# Patient Record
(demographics unavailable — no encounter records)

---

## 2024-10-10 NOTE — REASON FOR VISIT
[Home] : at home, [unfilled] , at the time of the visit. [Medical Office: ([facility])___] : at the medical office located in  [Mother] : mother [FreeTextEntry2] : mother

## 2024-10-10 NOTE — HISTORY OF PRESENT ILLNESS
[de-identified] : cereal with milk or eggs or bagel or pancakes or waffle [de-identified] : sandwich or rice with chicken [de-identified] : chips, grapes [de-identified] : salmon with quinoa, or burger with fries, or chicken nuggets [FreeTextEntry1] : Nutritionist Intake: 13 year old male with Crohn's disease on Infliximab, referred by Dr. Maldonado for dietary counseling. Josr initially presented early August 2023 with 10+ pound weight loss and loss of appetite, occasional abdominal pain and nausea.   Now with good wt gain and growth. Josr had 1-2 days of abdominal pain, decreased appetite, vomiting starting Monday. Most likely viral and not IBD related. Reports feeling much better today.   Pt typically has a good appetite and is a good eater. He eats a variety of foods, including fruits, vegetables, fish, chicken, beef, eggs, cashews.  Mom cooks most meals at home. They eat Armenian food and Syrian food (roti, rice, chicken, bok luigi, broccoli).  Pt eats fast food ([employer], [employer], or pizza) every other week. Pt says oreos bother his stomach. No food allergies. Pt is taking a MVI gummy and probiotic.

## 2025-02-26 NOTE — HISTORY OF PRESENT ILLNESS
[FreeTextEntry1] : NP rash [de-identified] : NP here with mom Rash on body and scalp Hx of Crohn's- feels like rash on body started before patient was started on renflexis (infliximab) but scalp issues started after Crohn's was hard to control for patient- finally got it under control so would prefer to stay on medication Have been using traimcinolone oint to body with good response Using keto shampoo and a steroid soln to scalp from outside derm

## 2025-02-26 NOTE — PHYSICAL EXAM
[FreeTextEntry3] : Hyperpigmented round patches upper extremities, AC fossa Two scaly plaques b/l frontal scalp

## 2025-02-26 NOTE — ASSESSMENT
[FreeTextEntry1] : #Dermatitis of body- favor nummular derm vs psoriasis #Scalp psoriasis- chronic, flare -C/w triamcinolone 0.1% ointment to AA bid as needed for up to 2 weeks at a time. SED. If more minimally active can use tacrolimus instead. 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

## 2025-05-22 NOTE — HISTORY OF PRESENT ILLNESS
[de-identified] : Overview: Josr presented early August 2023 with 10+ pound weight loss and loss of appetite, occasional abdominal pain and nausea. Initial labs identified an elevated ESR (38) otherwise unremarkable. Celiac serologies negative (mother has celiac disease). An EGD identified antral erythema, and erythema and ulceration in the duodenal bulb. Colonoscopy found erythema in the terminal ileum and rectum. Histopathology was notable for severe duodenitis in the bulb with neutrophils, villous atrophy and gastric metaplasia, focal chronic active gastritis, lymphocytic esophagitis, focal active ileitis with a focus suspicious for a microgranuloma, and in the cecum a single non-necrotizing granuloma. An MRE found inflammatory changes in the distal and terminal ileum. Infliximab was started October 2023. He had an excellent response to induction. Week 6 IFX trough 27. Had been doing well on an every 8 weeks until April when he presented with generalized abdominal pain and increased bowel movement frequency. His Renflexis dose was increased to 400 mg and shortened to a 5 week interval based on trough and clinical status. In September had diarrhea and pain leading up to his infusion therefore dose again was optimized to every 4 weeks. A scope for mucosal healing was performed in December 2024 which was both grossly and histologically normal.      Mother 4'11'' Father 5'4''  Previous Evaluations 9/2024 Quant Gold: negative   2023 Hep BsAg Negative, BsAb REACTIVE Varicella IgG: Positive.

## 2025-05-23 NOTE — HISTORY OF PRESENT ILLNESS
[de-identified] : Overview: Josr presented early August 2023 with 10+ pound weight loss and loss of appetite, occasional abdominal pain and nausea. Initial labs identified an elevated ESR (38) otherwise unremarkable. Celiac serologies negative (mother has celiac disease). An EGD identified antral erythema, and erythema and ulceration in the duodenal bulb. Colonoscopy found erythema in the terminal ileum and rectum. Histopathology was notable for severe duodenitis in the bulb with neutrophils, villous atrophy and gastric metaplasia, focal chronic active gastritis, lymphocytic esophagitis, focal active ileitis with a focus suspicious for a microgranuloma, and in the cecum a single non-necrotizing granuloma. An MRE found inflammatory changes in the distal and terminal ileum. Infliximab was started October 2023. He had an excellent response to induction. Week 6 IFX trough 27. Had been doing well on an every 8 weeks until April when he presented with generalized abdominal pain and increased bowel movement frequency. His Renflexis dose was increased to 400 mg and shortened to a 5 week interval based on trough and clinical status. In September had diarrhea and pain leading up to his infusion therefore dose again was optimized to every 4 weeks. A scope for mucosal healing was performed in December 2024 which was both grossly and histologically normal.     Mother 4'11'' Father 5'4''  Previous Evaluations 9/2024 Quant Gold: negative  2023 Hep BsAg Negative, BsAb REACTIVE Varicella IgG: Positive.

## 2025-05-23 NOTE — CONSULT LETTER
[Dear  ___] : Dear  [unfilled], [Courtesy Letter:] : I had the pleasure of seeing your patient, [unfilled], in my office today. [Please see my note below.] : Please see my note below. [Consult Closing:] : Thank you very much for allowing me to participate in the care of this patient.  If you have any questions, please do not hesitate to contact me. [Sincerely,] : Sincerely, [FreeTextEntry3] : COLE Barrera

## 2025-05-23 NOTE — HISTORY OF PRESENT ILLNESS
[de-identified] : Overview: Josr presented early August 2023 with 10+ pound weight loss and loss of appetite, occasional abdominal pain and nausea. Initial labs identified an elevated ESR (38) otherwise unremarkable. Celiac serologies negative (mother has celiac disease). An EGD identified antral erythema, and erythema and ulceration in the duodenal bulb. Colonoscopy found erythema in the terminal ileum and rectum. Histopathology was notable for severe duodenitis in the bulb with neutrophils, villous atrophy and gastric metaplasia, focal chronic active gastritis, lymphocytic esophagitis, focal active ileitis with a focus suspicious for a microgranuloma, and in the cecum a single non-necrotizing granuloma. An MRE found inflammatory changes in the distal and terminal ileum. Infliximab was started October 2023. He had an excellent response to induction. Week 6 IFX trough 27. Had been doing well on an every 8 weeks until April when he presented with generalized abdominal pain and increased bowel movement frequency. His Renflexis dose was increased to 400 mg and shortened to a 5 week interval based on trough and clinical status. In September had diarrhea and pain leading up to his infusion therefore dose again was optimized to every 4 weeks. A scope for mucosal healing was performed in December 2024 which was both grossly and histologically normal.     Mother 4'11'' Father 5'4''  Previous Evaluations 9/2024 Quant Gold: negative  2023 Hep BsAg Negative, BsAb REACTIVE Varicella IgG: Positive.

## 2025-05-23 NOTE — ASSESSMENT
[Educated Patient & Family about Diagnosis] : educated the patient and family about the diagnosis [FreeTextEntry1] : Josr is a 13 year old with ileal and upper tract Crohn's disease whom is in a remission on Infliximab monotherapy, with recent scope for mucosal healing. Current issues include ongoing issues with abdominal pain and constipation and a new onset of psoriasis/ dermatitis. Discussed in detail the concern for anti-TNF induced psoriasiform dermatitis and possibility of drug class change should therapies prescribed by dermatologist fail to control symptoms.  Should remain on IFX for now every 4 weeks with plan for labs with Bere infusion If trough supratherapeutic we will consider lengthening intervals Continue close contact with dermatologist- follow up visit next week To send growth records for review Continue Miralax and encourage titrating of dose to adequately control constipation as this is likely contributing to his intermittent abdominal pain  Call with an update after dermatologist visit, PRN

## 2025-05-23 NOTE — PHYSICAL EXAM
[Well Developed] : well developed [NAD] : in no acute distress [PERRL] : pupils were equal, round, reactive to light  [Moist & Pink Mucous Membranes] : moist and pink mucous membranes [CTAB] : lungs clear to auscultation bilaterally [Regular Rate and Rhythm] : regular rate and rhythm [Normal S1, S2] : normal S1 and S2 [Soft] : soft  [Normal Bowel Sounds] : normal bowel sounds [No HSM] : no hepatosplenomegaly appreciated [Normal Tone] : normal tone [Well-Perfused] : well-perfused [Interactive] : interactive [icteric] : anicteric [Respiratory Distress] : no respiratory distress  [Distended] : non distended [Tender] : non tender [Edema] : no edema [Cyanosis] : no cyanosis [Rash] : no rash [Jaundice] : no jaundice [de-identified] : erythema of the umbilicus; patch of eczema on right upper arm; scaling lesions with flaking skin in the scalp

## 2025-05-23 NOTE — HISTORY OF PRESENT ILLNESS
[de-identified] : Overview: Josr presented early August 2023 with 10+ pound weight loss and loss of appetite, occasional abdominal pain and nausea. Initial labs identified an elevated ESR (38) otherwise unremarkable. Celiac serologies negative (mother has celiac disease). An EGD identified antral erythema, and erythema and ulceration in the duodenal bulb. Colonoscopy found erythema in the terminal ileum and rectum. Histopathology was notable for severe duodenitis in the bulb with neutrophils, villous atrophy and gastric metaplasia, focal chronic active gastritis, lymphocytic esophagitis, focal active ileitis with a focus suspicious for a microgranuloma, and in the cecum a single non-necrotizing granuloma. An MRE found inflammatory changes in the distal and terminal ileum. Infliximab was started October 2023. He had an excellent response to induction. Week 6 IFX trough 27. Had been doing well on an every 8 weeks until April when he presented with generalized abdominal pain and increased bowel movement frequency. His Renflexis dose was increased to 400 mg and shortened to a 5 week interval based on trough and clinical status. In September had diarrhea and pain leading up to his infusion therefore dose again was optimized to every 4 weeks. A scope for mucosal healing was performed in December 2024 which was both grossly and histologically normal.  Interval hx: Continues to receive infliximab (Renflexis) 400 mg every 4 weeks with a trough of 25 in Jan 2025. He has been feeling well from infusion to infusion with rare abdominal pain. Continues to use MiraLAX PRN, 1 tbsp, for constipation. He is having daily to every other day bowel movements, large caliber. He rate of weight gain has been adequate but weight continues to remain at the 3rd percentile. Recently established care with dermatology for psorasis of the scalp and umbilicus and eczema/ dermaitis of his upper extremities.     Mother 4'11'' Father 5'4''  Previous Evaluations 9/2024 Quant Gold: negative  2023 Hep BsAg Negative, BsAb REACTIVE Varicella IgG: Positive.

## 2025-05-27 NOTE — ASSESSMENT
[FreeTextEntry1] : #Dermatitis of body - favor Psoriasis (potentially TNFi induced) vs Eczema new diagnosis of uncertain prognosis #Scalp psoriasis  chronic, improving - discussed good chance the rash on the scalp and body is related to infliximab - although rash is slowly progressing to now involve umbilicus, there is limited BSA and symptom burden. Will try to control with topical steroids for now so pt can c/w Infliximab as Crohns was hard to control - if not able to control w topicals, can consider skin biopsy to confirm dx and then adding adjunctive systemic agents vs biologic switch PLAN: - For Body: c/w triamcinolone 0.1% ointment to AA bid as needed for up to 2 weeks at a time. SED. Can use tacrolimus during off weeks - For Scalp: c/w Keto shampoo 2-3x/week. Can use Lidex soln to scalp bid prn up to 2 weeks at a time, SED  RTC 3 months

## 2025-05-27 NOTE — HISTORY OF PRESENT ILLNESS
[FreeTextEntry1] : f/u rash [de-identified] : RP here with mom Rash on body and scalp Hx of Crohn's- feels like rash on body started before patient was started on renflexis (infliximab) but scalp issues started after Crohn's was hard to control for patient- finally got it under control so would prefer to stay on medication - scalp has cleared up using Keto shampoo and Fluocinonide soln, not itchy - rash on body unimproved on arms, now with new involvement in bellybutton. rash is not itchy. has TAC oint and Tacro oint at home from prev derm, mom has been applying Tacro oint to the area w limited improvement - no joint pain/stiffness. Crohn's well controlled - no abd pain, gets constipated at times, taking Miralax.

## 2025-05-27 NOTE — PHYSICAL EXAM
[Alert] : alert [Oriented x 3] : ~L oriented x 3 [Well Nourished] : well nourished [Conjunctiva Non-injected] : conjunctiva non-injected [No Visual Lymphadenopathy] : no visual  lymphadenopathy [No Clubbing] : no clubbing [No Edema] : no edema [No Bromhidrosis] : no bromhidrosis [No Chromhidrosis] : no chromhidrosis [FreeTextEntry3] : scalp clear AC fossa, umbilicus with thin hypopigmented scaly plaques R upper arm w scaly, lichenified plaque

## 2025-07-18 NOTE — HISTORY OF PRESENT ILLNESS
[FreeTextEntry2] : Josr is a 14-year 7-month-old young man with Crohn's disease diagnosed at 12 years of age and family history of short stature who presents for initial endocrine evaluation of short stature  Medical history is significant for Crohn's disease which was diagnosed 2 years ago in the setting of diarrhea, weight loss and belly pain.  He sees Dr. Puente and has been stable on Renflexsis.   Review of pediatrician growth chart reveals steady linear growth around the 10th percentile from 4 to 10 years of age with mild deceleration to the 5th percentile until today.  Linear growth today is noted in the 4th percentile with BMI in the 44th percentile.  Mom notes since treatment treating his IBD he has gained significant weight back and feels well. There is no family history significant for growth hormone deficiency, thyroid disease.  Family history significant for mom with celiac disease.  There are however multiple people in the family on the shorter side including mom, maternal grandmother and paternal grandmother at 59 inches with dad at 63 inches.  He also has a 16-year-old brother who was somewhat of a late jitendra and is around that site. Mom's height 59 inches Dad's height 63 inches Maternal grandmother's 59 inches Paternal grandmother 59 inches Brother 62 inches 16 years old  Mom has emphasized that she would just like to make sure that he is healthy but is not inclined to give him medications that will augment his height if they are not needed.

## 2025-07-18 NOTE — CONSULT LETTER
[Dear  ___] : Dear  [unfilled], [Consult Letter:] : I had the pleasure of evaluating your patient, [unfilled]. [Please see my note below.] : Please see my note below. [Sincerely,] : Sincerely, [FreeTextEntry3] : Sayra Fowler MD  [facility] Physician [facility] Division of Pediatric Endocrinology P: [phone] F: [phone]

## 2025-07-18 NOTE — ASSESSMENT
[FreeTextEntry1] : Josr is a 14-year 7-month-old with Crohn's disease and concern for short stature.  Given mild deceleration from the 10th percentile until 10 years of age to the 5th percentile today, I have noted that workup should be undertaken to rule out nutritional anemia, other inflammatory conditions, celiac disease, thyroid disease, growth hormone deficiency.  Will evaluate with CBC, CMP, ESR, CRP, TTG, TSH, free T4, IGF-I, IGFBP-3.  Also obtain bone age to assess skeletal maturity.  Will also obtain LH, FSH, testosterone, vitamin D.  I have discussed the possibility of use of Arimidex.  Mom would like to hear more if this will help but is not inclined to add medications that are not needed.  I am in agreement.